# Patient Record
Sex: MALE | Race: WHITE
[De-identification: names, ages, dates, MRNs, and addresses within clinical notes are randomized per-mention and may not be internally consistent; named-entity substitution may affect disease eponyms.]

---

## 2019-04-04 ENCOUNTER — HOSPITAL ENCOUNTER (OUTPATIENT)
Dept: HOSPITAL 92 - BICMRI | Age: 64
Discharge: HOME | End: 2019-04-04
Attending: NEUROLOGICAL SURGERY
Payer: MEDICARE

## 2019-04-04 DIAGNOSIS — M48.07: ICD-10-CM

## 2019-04-04 DIAGNOSIS — Q76.2: ICD-10-CM

## 2019-04-04 DIAGNOSIS — M54.5: Primary | ICD-10-CM

## 2019-04-04 DIAGNOSIS — M48.061: ICD-10-CM

## 2019-04-04 PROCEDURE — 72148 MRI LUMBAR SPINE W/O DYE: CPT

## 2019-04-04 NOTE — MRI
FMRI lumbar spine without contrast:



History: Low back pain



COMPARISON: None



FINDINGS:



5 lumbar type vertebral bodies are seen. For the purposes of this exam, due to the lack of radiograph
ic comparisons. The conus medullaris seems to terminate at approximately the  L1 level.



The visualized retroperitoneum paravertebral soft tissues appear within normal limits.



The bone marrow signal intensity appears within normal limits.



At the L5-S1 level there is severe facet joint degenerative change and a broad-based disc bulge with 
loss of disc space height inducing mild bilateral neural foraminal narrowing.



At L4-5, there is advanced facet joint degenerative change, ligamentum flavum hypertrophy and a broad
-based disc bulging inducing severe central canal narrowing with moderate right and severe left neura
l foraminal narrowing.



At L3-4, there is a broad-based bulge with facet hypertrophy and loss of disc space height inducing m
oderate to severe bilateral neural foraminal narrowing and mild-to-moderate central canal narrowing.



At L2-3, there is broad-based bulge with facet hypertrophy inducing mild bilateral neural foraminal n
arrowing.



At L1-2, there is a broad-based bulge but no appreciable central canal or neural foraminal narrowing.




At T12-L1, there is a small central protrusion. There is a mild broad-based bulge but no appreciable 
central canal or neural foraminal narrowing.



IMPRESSION: 



1. Severe central canal narrowing at L4-5 with moderate right and severe left neural foraminal narrow
ing.

2. Mild/moderate central canal narrowing at L3-4 with moderate to severe bilateral neural foraminal n
arrowing.

3. Mild bilateral neural foraminal narrowing at L2-3 and L5-S1.



.



Transcribed Date/Time: 4/4/2019 11:19 AM



Reported By: Brandon Roa 

Electronically Signed:  4/4/2019 11:21 AM

## 2025-01-22 ENCOUNTER — HOSPITAL ENCOUNTER (EMERGENCY)
Dept: HOSPITAL 92 - CSHERS | Age: 70
Discharge: HOME | End: 2025-01-22
Payer: MEDICARE

## 2025-01-22 DIAGNOSIS — I10: ICD-10-CM

## 2025-01-22 DIAGNOSIS — E11.9: ICD-10-CM

## 2025-01-22 DIAGNOSIS — J10.1: Primary | ICD-10-CM

## 2025-01-22 LAB
ALBUMIN SERPL BCG-MCNC: 4.2 G/DL (ref 3.1–4.5)
ALP SERPL-CCNC: 48 U/L (ref 40–110)
ALT SERPL W P-5'-P-CCNC: 41 U/L (ref ?–45)
ANION GAP SERPL CALC-SCNC: 18 MMOL/L (ref 10–20)
AST SERPL-CCNC: 55 U/L (ref 11–34)
BASOPHILS # BLD AUTO: 0.04 10X3/UL (ref 0–0.2)
BASOPHILS NFR BLD AUTO: 0.5 % (ref 0–2)
BILIRUB SERPL-MCNC: 0.5 MG/DL (ref 0.3–1.2)
BUN SERPL-MCNC: 21 MG/DL (ref 8.4–25.7)
CALCIUM SERPL-MCNC: 9 MG/DL (ref 7.8–10.44)
CHLORIDE SERPL-SCNC: 100 MMOL/L (ref 98–107)
CO2 SERPL-SCNC: 19 MMOL/L (ref 23–31)
CREAT CL PREDICTED SERPL C-G-VRATE: 0 ML/MIN (ref 70–130)
EOSINOPHIL # BLD AUTO: 0.17 10X3/UL (ref 0–0.5)
EOSINOPHIL NFR BLD AUTO: 2.1 % (ref 0–6)
GLOBULIN SER CALC-MCNC: 3.7 G/DL (ref 2.4–3.5)
GLUCOSE SERPL-MCNC: 112 MG/DL (ref 83–110)
HCT VFR BLD CALC: 48.6 % (ref 38.8–50)
HGB BLD-MCNC: 16.7 G/DL (ref 13.5–17.5)
LYMPHOCYTES NFR BLD AUTO: 13.4 % (ref 18–47)
MCH RBC QN AUTO: 30.1 PG (ref 27–33)
MCV RBC AUTO: 87.6 FL (ref 81.2–95.1)
MONOCYTES # BLD AUTO: 0.57 10X3/UL (ref 0–1.1)
MONOCYTES NFR BLD AUTO: 7.1 % (ref 0–10)
NEUTROPHILS # BLD AUTO: 6.15 10X3/UL (ref 1.5–8.4)
NEUTROPHILS NFR BLD AUTO: 76.7 % (ref 40–75)
PLATELET # BLD AUTO: 228 10X3/UL (ref 150–450)
POTASSIUM SERPL-SCNC: 4.3 MMOL/L (ref 3.5–5.1)
RBC # BLD AUTO: 5.55 10X6/UL (ref 4.32–5.72)
SODIUM SERPL-SCNC: 133 MMOL/L (ref 136–145)
WBC # BLD AUTO: 8.03 10X3/UL (ref 3.5–10.5)

## 2025-01-22 PROCEDURE — 87428 SARSCOV & INF VIR A&B AG IA: CPT

## 2025-01-22 PROCEDURE — 96375 TX/PRO/DX INJ NEW DRUG ADDON: CPT

## 2025-01-22 PROCEDURE — 96374 THER/PROPH/DIAG INJ IV PUSH: CPT

## 2025-01-22 PROCEDURE — 80053 COMPREHEN METABOLIC PANEL: CPT

## 2025-01-22 PROCEDURE — 85025 COMPLETE CBC W/AUTO DIFF WBC: CPT
